# Patient Record
Sex: MALE | URBAN - METROPOLITAN AREA
[De-identification: names, ages, dates, MRNs, and addresses within clinical notes are randomized per-mention and may not be internally consistent; named-entity substitution may affect disease eponyms.]

---

## 2018-11-06 ENCOUNTER — IMPORTED ENCOUNTER (OUTPATIENT)
Dept: URBAN - METROPOLITAN AREA CLINIC 1 | Facility: CLINIC | Age: 53
End: 2018-11-06

## 2018-11-06 PROBLEM — E11.9: Noted: 2018-11-06

## 2018-11-06 PROBLEM — H04.123: Noted: 2018-11-06

## 2018-11-06 PROBLEM — H25.813: Noted: 2018-11-06

## 2018-11-06 PROBLEM — H40.053: Noted: 2018-11-06

## 2018-11-06 PROBLEM — Z79.84: Noted: 2018-11-06

## 2018-11-06 PROCEDURE — 92004 COMPRE OPH EXAM NEW PT 1/>: CPT

## 2018-11-06 PROCEDURE — 92133 CPTRZD OPH DX IMG PST SGM ON: CPT

## 2018-11-06 NOTE — PATIENT DISCUSSION
OHTN OU- Observe for changes/progression. Patient to continue with Trusopt OU BID. H/o intolerant to Brimonidine per pt.

## 2018-11-06 NOTE — PATIENT DISCUSSION
1.  DM Type II without sign of diabetic retinopathy and no blot heme on dilated retinal examination today OU No Macular Edema. Discussed the pathophysiology of diabetes and its effect on the eye and risk of blindness. Stressed the importance of strong glucose control. Advised of importance of at least yearly dilated examinations but to contact us immediately for any problems or concerns. 2. Type II diabetes controlled by oral medications. 3.  OHTN OU- Normal OCT OU. Recommend switching Trusopt to Azopt OU BID (sent to [facility] H/o intolerance to Brimonidine per pt. Observe for changes/progression. 4.  Cataract OU- Observe for now without intervention. The patient was advised to contact us if any change or worsening of vision5. Dry Eyes OU- Controlled. Continue Restasis OU BID. The continuation of artificial tears OU BID were recommended. 6.  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 Patient defers the refraction at today's visit8. Return for an appointment for 10/[facility] in 6 months with Dr. Shereen Garcia.

## 2020-05-11 NOTE — PATIENT DISCUSSION
The patient was here for a vision care examination. There were no untoward findings noted. Repeat evaluation in one to two years is indicated. Include Location In Plan?: No Detail Level: Zone

## 2022-04-02 ASSESSMENT — VISUAL ACUITY
OD_CC: 20/40
OS_CC: J1+
OD_CC: J1+
OS_CC: 20/25
OS_SC: 20/20
OD_SC: 20/20

## 2022-04-02 ASSESSMENT — TONOMETRY
OS_IOP_MMHG: 23
OD_IOP_MMHG: 21